# Patient Record
Sex: MALE | Race: WHITE
[De-identification: names, ages, dates, MRNs, and addresses within clinical notes are randomized per-mention and may not be internally consistent; named-entity substitution may affect disease eponyms.]

---

## 2021-05-28 ENCOUNTER — HOSPITAL ENCOUNTER (OUTPATIENT)
Dept: HOSPITAL 95 - ORSCMMR | Age: 51
Discharge: HOME | End: 2021-05-28
Attending: STUDENT IN AN ORGANIZED HEALTH CARE EDUCATION/TRAINING PROGRAM
Payer: COMMERCIAL

## 2021-05-28 VITALS — BODY MASS INDEX: 43.13 KG/M2 | HEIGHT: 71.65 IN | WEIGHT: 315 LBS

## 2021-05-28 DIAGNOSIS — Z79.899: ICD-10-CM

## 2021-05-28 DIAGNOSIS — G47.33: ICD-10-CM

## 2021-05-28 DIAGNOSIS — D12.0: ICD-10-CM

## 2021-05-28 DIAGNOSIS — D12.5: ICD-10-CM

## 2021-05-28 DIAGNOSIS — Z12.11: Primary | ICD-10-CM

## 2021-05-28 DIAGNOSIS — K62.1: ICD-10-CM

## 2021-05-28 DIAGNOSIS — E03.9: ICD-10-CM

## 2021-05-28 DIAGNOSIS — I10: ICD-10-CM

## 2021-05-28 DIAGNOSIS — K64.4: ICD-10-CM

## 2021-05-28 DIAGNOSIS — E11.9: ICD-10-CM

## 2021-05-28 DIAGNOSIS — K64.8: ICD-10-CM

## 2021-05-28 DIAGNOSIS — K57.30: ICD-10-CM

## 2021-05-28 NOTE — NUR
05/28/21 0808 Breanna Berumen
History, Chart, Medications and Allergies reviewed before start of
procedure.MONITOR INTACT WITH CONTINUOUS PULSE OXIMETRY AND
INTERMITTENT BP.3-LEAD EKG REVIEWED WITH PHYSICIAN PRIOR TO START OF
PROCEDURE.O2 VIA NRM. MAC CASE WITH DR. SPENCE

## 2021-05-28 NOTE — NUR
Patient up to Ambulate independently. Gait steady.
Discharge instructions reviewed with patient. Patient verbalizes understanding.
Copy given to patient to take home.
Discharged via wheelchair to private car for ride home WITH WIFE

## 2021-05-28 NOTE — NUR
Ambulatory in Day Surgery
History, Chart, Medications and Allergies reviewed before start of
procedure.Patient confirms NPO status and agrees with scheduled surgery.
Patient states colon prep results clear. PT REPORTS NOT DIABETIC, ON NO
DIABETES MEDS, NO BLOOD SUGAR CHECKED.

## 2021-10-19 ENCOUNTER — HOSPITAL ENCOUNTER (OUTPATIENT)
Dept: HOSPITAL 95 - ATC | Age: 51
Discharge: HOME | End: 2021-10-19
Attending: FAMILY MEDICINE
Payer: COMMERCIAL

## 2021-10-19 DIAGNOSIS — I10: ICD-10-CM

## 2021-10-19 DIAGNOSIS — Z79.890: ICD-10-CM

## 2021-10-19 DIAGNOSIS — U07.1: Primary | ICD-10-CM

## 2021-10-19 DIAGNOSIS — J30.9: ICD-10-CM

## 2021-10-19 DIAGNOSIS — E66.01: ICD-10-CM

## 2021-10-19 DIAGNOSIS — Z79.899: ICD-10-CM

## 2021-10-19 DIAGNOSIS — E78.5: ICD-10-CM

## 2022-08-24 ENCOUNTER — HOSPITAL ENCOUNTER (INPATIENT)
Dept: HOSPITAL 95 - ER | Age: 52
LOS: 7 days | Discharge: HOME | DRG: 872 | End: 2022-08-31
Attending: INTERNAL MEDICINE | Admitting: HOSPITALIST
Payer: COMMERCIAL

## 2022-08-24 VITALS — BODY MASS INDEX: 45.1 KG/M2 | WEIGHT: 315 LBS | HEIGHT: 70 IN

## 2022-08-24 DIAGNOSIS — Z99.81: ICD-10-CM

## 2022-08-24 DIAGNOSIS — A41.9: Primary | ICD-10-CM

## 2022-08-24 DIAGNOSIS — G47.33: ICD-10-CM

## 2022-08-24 DIAGNOSIS — E78.5: ICD-10-CM

## 2022-08-24 DIAGNOSIS — E66.9: ICD-10-CM

## 2022-08-24 DIAGNOSIS — R59.1: ICD-10-CM

## 2022-08-24 DIAGNOSIS — Z98.890: ICD-10-CM

## 2022-08-24 DIAGNOSIS — E03.9: ICD-10-CM

## 2022-08-24 DIAGNOSIS — Z79.899: ICD-10-CM

## 2022-08-24 DIAGNOSIS — I10: ICD-10-CM

## 2022-08-24 DIAGNOSIS — L03.115: ICD-10-CM

## 2022-08-24 DIAGNOSIS — D72.828: ICD-10-CM

## 2022-08-24 DIAGNOSIS — H10.31: ICD-10-CM

## 2022-08-24 LAB
ALBUMIN SERPL BCP-MCNC: 2.6 G/DL (ref 3.4–5)
ALBUMIN/GLOB SERPL: 0.5 {RATIO} (ref 0.8–1.8)
ALT SERPL W P-5'-P-CCNC: 32 U/L (ref 12–78)
ANION GAP SERPL CALCULATED.4IONS-SCNC: 8 MMOL/L (ref 6–16)
AST SERPL W P-5'-P-CCNC: 21 U/L (ref 12–37)
BASOPHILS # BLD: 0 K/MM3 (ref 0–0.23)
BASOPHILS NFR BLD: 0 % (ref 0–2)
BILIRUB SERPL-MCNC: 0.5 MG/DL (ref 0.1–1)
BUN SERPL-MCNC: 17 MG/DL (ref 8–24)
CALCIUM SERPL-MCNC: 9.6 MG/DL (ref 8.5–10.1)
CHLORIDE SERPL-SCNC: 102 MMOL/L (ref 98–108)
CO2 SERPL-SCNC: 27 MMOL/L (ref 21–32)
CREAT SERPL-MCNC: 0.97 MG/DL (ref 0.6–1.2)
DEPRECATED RDW RBC AUTO: 42.6 FL (ref 35.1–46.3)
EOSINOPHIL # BLD: 0 K/MM3 (ref 0–0.68)
EOSINOPHIL NFR BLD: 0 % (ref 0–6)
ERYTHROCYTE [DISTWIDTH] IN BLOOD BY AUTOMATED COUNT: 13.2 % (ref 11.7–14.2)
GLOBULIN SER CALC-MCNC: 5.6 G/DL (ref 2.2–4)
GLUCOSE SERPL-MCNC: 121 MG/DL (ref 70–99)
HCT VFR BLD AUTO: 40.9 % (ref 37–53)
HGB BLD-MCNC: 13.9 G/DL (ref 13.5–17.5)
LYMPHOCYTES # BLD: 0.76 K/MM3 (ref 0.84–5.2)
LYMPHOCYTES NFR BLD: 5 % (ref 21–46)
MCHC RBC AUTO-ENTMCNC: 34 G/DL (ref 31.5–36.5)
MCV RBC AUTO: 88 FL (ref 80–100)
MONOCYTES # BLD: 1.21 K/MM3 (ref 0.16–1.47)
MONOCYTES NFR BLD: 8 % (ref 4–13)
MYELOCYTES # BLD MANUAL: 0.3 K/MM3 (ref 0–0)
MYELOCYTES NFR BLD MANUAL: 2 % (ref 0–0)
NEUTS BAND NFR BLD MANUAL: 2 % (ref 0–8)
NEUTS SEG # BLD MANUAL: 12.95 K/MM3 (ref 1.96–9.15)
NEUTS SEG NFR BLD MANUAL: 83 % (ref 41–73)
NRBC # BLD AUTO: 0 K/MM3 (ref 0–0.02)
NRBC BLD AUTO-RTO: 0 /100 WBC (ref 0–0.2)
PLATELET # BLD AUTO: 196 K/MM3 (ref 150–400)
POTASSIUM SERPL-SCNC: 3.7 MMOL/L (ref 3.5–5.5)
PROT SERPL-MCNC: 8.2 G/DL (ref 6.4–8.2)
SODIUM SERPL-SCNC: 137 MMOL/L (ref 136–145)
TOTAL CELLS COUNTED BLD: 100

## 2022-08-24 PROCEDURE — A9270 NON-COVERED ITEM OR SERVICE: HCPCS

## 2022-08-25 LAB
ALBUMIN SERPL BCP-MCNC: 2.2 G/DL (ref 3.4–5)
ANION GAP SERPL CALCULATED.4IONS-SCNC: 7 MMOL/L (ref 6–16)
BASOPHILS # BLD AUTO: 0.09 K/MM3 (ref 0–0.23)
BASOPHILS NFR BLD AUTO: 1 % (ref 0–2)
BUN SERPL-MCNC: 18 MG/DL (ref 8–24)
CALCIUM SERPL-MCNC: 8.5 MG/DL (ref 8.5–10.1)
CHLORIDE SERPL-SCNC: 101 MMOL/L (ref 98–108)
CO2 SERPL-SCNC: 29 MMOL/L (ref 21–32)
CREAT SERPL-MCNC: 1.03 MG/DL (ref 0.6–1.2)
DEPRECATED RDW RBC AUTO: 44 FL (ref 35.1–46.3)
EOSINOPHIL # BLD AUTO: 0.05 K/MM3 (ref 0–0.68)
EOSINOPHIL NFR BLD AUTO: 0 % (ref 0–6)
ERYTHROCYTE [DISTWIDTH] IN BLOOD BY AUTOMATED COUNT: 13.4 % (ref 11.7–14.2)
GLUCOSE SERPL-MCNC: 114 MG/DL (ref 70–99)
HCT VFR BLD AUTO: 36.7 % (ref 37–53)
HGB BLD-MCNC: 12.2 G/DL (ref 13.5–17.5)
IMM GRANULOCYTES # BLD AUTO: 0.36 K/MM3 (ref 0–0.1)
IMM GRANULOCYTES NFR BLD AUTO: 3 % (ref 0–1)
LYMPHOCYTES # BLD AUTO: 1.48 K/MM3 (ref 0.84–5.2)
LYMPHOCYTES NFR BLD AUTO: 10 % (ref 21–46)
MCHC RBC AUTO-ENTMCNC: 33.2 G/DL (ref 31.5–36.5)
MCV RBC AUTO: 90 FL (ref 80–100)
MONOCYTES # BLD AUTO: 1.66 K/MM3 (ref 0.16–1.47)
MONOCYTES NFR BLD AUTO: 12 % (ref 4–13)
NEUTROPHILS # BLD AUTO: 10.75 K/MM3 (ref 1.96–9.15)
NEUTROPHILS NFR BLD AUTO: 75 % (ref 41–73)
NRBC # BLD AUTO: 0 K/MM3 (ref 0–0.02)
NRBC BLD AUTO-RTO: 0 /100 WBC (ref 0–0.2)
PHOSPHATE SERPL-MCNC: 3 MG/DL (ref 2.5–4.9)
PLATELET # BLD AUTO: 183 K/MM3 (ref 150–400)
POTASSIUM SERPL-SCNC: 3.6 MMOL/L (ref 3.5–5.5)
SODIUM SERPL-SCNC: 137 MMOL/L (ref 136–145)

## 2022-08-25 NOTE — NUR
SHIFT SUMMARY
 
PATIENT MEDICATED X1 FOR PAIN IN RLE WITH IV TORADOL. DENIES NAUSEA AND
SHORTNESS OF BREATH. UP SBA W/FWW TO BATHROOM. GOOD APPETITE. POWERGLIDE
PLACED TODAY. RLE ELEVATED ON PILLOW. PATIENT PLEASANT AND COOPERATE WITH
CARE.

## 2022-08-25 NOTE — NUR
SHIFT SUMMARY;  THIS MELISSA IS A LATE ADMIT FROM ER DURING NOC SHIFT TONIGHT.
ADMITTING DIAGNOSES IS CELLULITIS RIGHT LOWER LEG.  HE IS ABLE TO AMBULATE
WITH SBA TO AND FROM BATHROOM. HE IS INDEPENDANT OF ALL OTHER ADL'S.  HE IS AO
X 4 ON ARRIVAL.
 
PATIENT WEARS A CPAP AT NIGHT AND BROUGHT HIS HOME MACHINE IN TO HOSPITAL, RT
NOTIFIED AND THEY CAME AND SET UP MACHINE WITH CONTINUOUS SAT MONITOR.
 
MELISSA IS ON ROOM AIR AT HOME AND CURRENTLY HERE ON THE MED FLOOR.  HIS VITAL
SIGNS ARE STABLE AND MELISSA IS NOT FEBRILE.  HE EXPRESSES HIS LEG HAS A
BURNING SENSATION AND ONLY HAS MUCH PAIN WHEN HE AMBULATES.  HIS LUNGS ARE
CLEAR TO AUSCULTATION AND PATIENT DENIES COUGH.  HIS BOWEL TONES ARE CURRENTLY
HYPERACTIVE. PATIENT SAYS HE HAS NOT BEEN EATING MUCH SINCE HIS LEG STARTED
FEELING INFECTED.  HIS ABDOMEN IS DISTENDED HOWEVER PATIENT SAYS THIS IS
NORMAL FOR HIM.
 
HE HAS PURPLE COLORED DIME SIZE SCARS TO BILATERAL LOWER EXTREMITIES WHICH
PATIENT SAYS ARE FROM WELDING OVER THE PAST FEW YEARS.
 
HE IS ON A CARDIAC DIET.  PATIENT PROVIDED WITH WATER AT BEDSIDE. HE REFUSES
ANY SNACKS TONIGHT.
 
PATIENT REFUSES ANY PAIN MEDICATION AT THIS TIME. SAYING THAT AS LONG AS HE IS
NOT WALKING, HIS PAIN IS BEARABLE.
 
WILL CONTINUE TO MOONITOR THIS PATIENT FOR ANY WANTS OR NEEDS UNTIL HAND OFF
AND REPORT TO DAY SHIFT RN.

## 2022-08-26 LAB
ALBUMIN SERPL BCP-MCNC: 2.2 G/DL (ref 3.4–5)
ANION GAP SERPL CALCULATED.4IONS-SCNC: 6 MMOL/L (ref 6–16)
BUN SERPL-MCNC: 23 MG/DL (ref 8–24)
CALCIUM SERPL-MCNC: 8.5 MG/DL (ref 8.5–10.1)
CHLORIDE SERPL-SCNC: 101 MMOL/L (ref 98–108)
CO2 SERPL-SCNC: 29 MMOL/L (ref 21–32)
CREAT SERPL-MCNC: 1.04 MG/DL (ref 0.6–1.2)
DEPRECATED RDW RBC AUTO: 43.6 FL (ref 35.1–46.3)
ERYTHROCYTE [DISTWIDTH] IN BLOOD BY AUTOMATED COUNT: 13.3 % (ref 11.7–14.2)
GLUCOSE SERPL-MCNC: 119 MG/DL (ref 70–99)
HCT VFR BLD AUTO: 34.7 % (ref 37–53)
HGB BLD-MCNC: 11.5 G/DL (ref 13.5–17.5)
MCHC RBC AUTO-ENTMCNC: 33.1 G/DL (ref 31.5–36.5)
MCV RBC AUTO: 90 FL (ref 80–100)
NRBC # BLD AUTO: 0 K/MM3 (ref 0–0.02)
NRBC BLD AUTO-RTO: 0 /100 WBC (ref 0–0.2)
PHOSPHATE SERPL-MCNC: 3.7 MG/DL (ref 2.5–4.9)
PLATELET # BLD AUTO: 181 K/MM3 (ref 150–400)
POTASSIUM SERPL-SCNC: 3.4 MMOL/L (ref 3.5–5.5)
SODIUM SERPL-SCNC: 136 MMOL/L (ref 136–145)

## 2022-08-26 NOTE — NUR
2018 PT LYING IN BED, REPORTS PAIN IN NECK/HEAD/RLE, GAVE TYLENOL, WILL EVAL
FOR EFFECT. CELLULITIS/REDNESS/EDEMA 2-3+ IIN RLE, EDEMA 1--2+ LLE. NO OTHER
APPARENT SIGNS OF DISTRESS. CALL LIGHT IS IN REACH.

## 2022-08-26 NOTE — NUR
SHIFT SUMMARY:
 
NO ACUTE EVENTS. PT'S WIFE STATED THIS EVENING THAT RLE ERYTHEMA HAS NOT
CHANGED SINCE ADMISSION, IS WONDERING IF ABX SHOULD BE CHANGED. WHEN PT HAD
THIS BEFORE SEVERAL YEARS AGO, HE WAS GIVEN LEVOFLOXACIN WHICH RESOLVED
INFECTION. GETTING UP TO BR WITH FWW. MEDICATED FOR PAIN X 1 WITH RELIEF.
POTASSIUM REPLACED. CPAP AT HS.

## 2022-08-26 NOTE — NUR
PT LYING IN BED, EYES CLOSD, APPEARS TO BE RESTING. WAKES EASILY TO VERBAL
STIMULI. NO APPARENT SIGNS OF DISTRESS. DENIES NEED FOR ANYTHIG AT THIS TIME.
CALL LIGHT IS IN REACH.

## 2022-08-26 NOTE — NUR
SHIFT SUMMARY
 
PT MEDICATED X1 FOR PAIN IN RLE WITH IV TORADOL. DENIES NAUSEA AND SOB. PT HAS
RLE CELLULITIS AND STATED HIS RIGHT INNER THIGH WAS BOTHERING HIM MORE TODAY,
THE SKIN IN THAT AREA HAS SOME REDNESS. PT SLEPT WELL T/O THE NIGHT WITH
BIPAP. PT STATES HE HAS NOT HAD A BM FOR A FEW DAYS, MIRALAX IS SCHEDULED FOR
0900. PT IS AOX4, INDEPENDANT IN ROOM WITH SBA, PLEASANT AND COOPERATIVE WITH
CARE.

## 2022-08-27 NOTE — NUR
PT LYING IN BED, EYES CLOSED, APPEARS TO BE RESTING. BREATHING IS EVEN,
UNLABORED. CPAP AND CONTINUOUS BIOX ARE ON. NO APPARENT SIGNS OF DISTRESS.
CALL LIGHT IS IN REACH.

## 2022-08-27 NOTE — NUR
SHIFT SUMMARY:
 
NO ACUTE EVENTS. ERYTHEMA ON RLE APPEARS SLIGHTLY DECREASED. PT IS ABLE TO
AMBULATE WITH MINIMAL DISCOMFORT, USES FWW FOR STABILITY. APPETITE IS GOOD.
MEDICATED FOR PAIN X 1 WITH GOOD RELIEF.

## 2022-08-27 NOTE — NUR
PT LYING IN BED, EYES CLOSED, APPEARS TO BE RESTING. WAKES EASILY TO VERBAL
STIMULI. NO APPARENT SIGNS OF DISTRESS. CALL LIGHT IS IN REACH. DENIES NEED
FOR ANYTHING AT THIS TIME.

## 2022-08-27 NOTE — NUR
2014 PT LYING IN BED, DENIES PAIN AT THIS TIME. REDNESS AND EDEMA 2-3+ R LEG,
EDEMA 1-2+ L LEG. NO OTHER APPARENT SIGNS OF DISTRESS. CALL LIGHT IS IN REACH.

## 2022-08-27 NOTE — NUR
PT LYING IN BED, EYES CLOSED, APPEARS TO BE RESTING. BREATHING IS EVEN,
UNLABORED. NO APPARENT SIGNS OF DISTRESSL CALL LIGHT IS IN REACH.

## 2022-08-28 LAB
ALBUMIN SERPL BCP-MCNC: 2.2 G/DL (ref 3.4–5)
ANION GAP SERPL CALCULATED.4IONS-SCNC: 6 MMOL/L (ref 6–16)
BASOPHILS # BLD: 0 K/MM3 (ref 0–0.23)
BASOPHILS NFR BLD: 0 % (ref 0–2)
BUN SERPL-MCNC: 17 MG/DL (ref 8–24)
CALCIUM SERPL-MCNC: 8.4 MG/DL (ref 8.5–10.1)
CHLORIDE SERPL-SCNC: 100 MMOL/L (ref 98–108)
CO2 SERPL-SCNC: 31 MMOL/L (ref 21–32)
CREAT SERPL-MCNC: 0.99 MG/DL (ref 0.6–1.2)
DEPRECATED RDW RBC AUTO: 44 FL (ref 35.1–46.3)
EOSINOPHIL # BLD: 0.3 K/MM3 (ref 0–0.68)
EOSINOPHIL NFR BLD: 2 % (ref 0–6)
ERYTHROCYTE [DISTWIDTH] IN BLOOD BY AUTOMATED COUNT: 13.2 % (ref 11.7–14.2)
GLUCOSE SERPL-MCNC: 129 MG/DL (ref 70–99)
HCT VFR BLD AUTO: 34.4 % (ref 37–53)
HGB BLD-MCNC: 11.4 G/DL (ref 13.5–17.5)
LYMPHOCYTES # BLD: 1.35 K/MM3 (ref 0.84–5.2)
LYMPHOCYTES NFR BLD: 9 % (ref 21–46)
MCHC RBC AUTO-ENTMCNC: 33.1 G/DL (ref 31.5–36.5)
MCV RBC AUTO: 91 FL (ref 80–100)
METAMYELOCYTES # BLD MANUAL: 0.3 K/MM3 (ref 0–0)
METAMYELOCYTES NFR BLD MANUAL: 2 % (ref 0–0)
MONOCYTES # BLD: 1.05 K/MM3 (ref 0.16–1.47)
MONOCYTES NFR BLD: 7 % (ref 4–13)
MYELOCYTES # BLD MANUAL: 0.3 K/MM3 (ref 0–0)
MYELOCYTES NFR BLD MANUAL: 2 % (ref 0–0)
NEUTS BAND NFR BLD MANUAL: 10 % (ref 0–8)
NEUTS SEG # BLD MANUAL: 11.76 K/MM3 (ref 1.96–9.15)
NEUTS SEG NFR BLD MANUAL: 68 % (ref 41–73)
NRBC # BLD AUTO: 0 K/MM3 (ref 0–0.02)
NRBC BLD AUTO-RTO: 0 /100 WBC (ref 0–0.2)
PHOSPHATE SERPL-MCNC: 3.2 MG/DL (ref 2.5–4.9)
PLATELET # BLD AUTO: 223 K/MM3 (ref 150–400)
POTASSIUM SERPL-SCNC: 3.5 MMOL/L (ref 3.5–5.5)
SODIUM SERPL-SCNC: 137 MMOL/L (ref 136–145)
TOTAL CELLS COUNTED BLD: 100

## 2022-08-28 NOTE — NUR
PT LYING IN BED, EYES CLOSED, APPEARS TO BE RESTING. BREATHING IS EVEN,
UNLABORED. NO APPARENT SIGNS OF DISTRESS. CPAP AND CONT BIOC ARE ON. CALL
LIGHT IS IN REACH.

## 2022-08-28 NOTE — NUR
PT LYING IN BED, EYES CLOSED, APPEARS TO BE RESTING. BREATHING IS EVEN,
UNLABORED. NO APPARENT SIGNS OF DISTRESS. CALL LIGHT IS IN REACH. CPAP AND
CONTINUOUS BIOX ON.

## 2022-08-28 NOTE — NUR
SHIFT SUMMARY:
 
NO ACUTE EVENTS. RLE CELLULITIS SHOWS NO IMPROVEMENT AND HE STATES PAIN HAS
INCREASED; ABX INTERVAL CHANGED TO Q6 HOURS. MEDICATED FOR PAIN PER EMAR WITH
ADEQUATE RELIEF. R EYE CONJUNCTIVITIS IMPROVED, SWELLING DOWN. WEARING CPAP
WHILE ASLEEP. GOOD APPETITE AND PO FLUID INTAKE. IS WALKING IN HALLWAY TO HELP
EDEMA, ELEVATED ON 2 PILLOWS WHEN IN BED.

## 2022-08-28 NOTE — NUR
2014 PT LYING IN BED, REPORTS PAIN IN R HIP AND R LEG. RECENTLY GOT TYLENOL.
GAVE TORADOL. WILL EVAL FOR EFFECT. REDNESS AND EDEMA 2-3+ IN RLE. EDEMA 1-2+
LLE. TEMP .5, PT RECENTLY RECIEVED TYLENOL. TURNED TEMP IN ROOM DOWN,
MADE SURE THERE WAS ONLY 1 BLANKET. PT DECLINED TO USE ICE PACK OR COOL
WASHCLOTH. NO OTHER APPARENT SIGNS OF DISTRESS. CALL LIGHT IS IN REACH.

## 2022-08-29 LAB
BASOPHILS # BLD: 0 K/MM3 (ref 0–0.23)
BASOPHILS # BLD: 0 K/MM3 (ref 0–0.23)
BASOPHILS NFR BLD: 0 % (ref 0–2)
BASOPHILS NFR BLD: 0 % (ref 0–2)
DEPRECATED RDW RBC AUTO: 43 FL (ref 35.1–46.3)
DEPRECATED RDW RBC AUTO: 43.5 FL (ref 35.1–46.3)
EOSINOPHIL # BLD: 0 K/MM3 (ref 0–0.68)
EOSINOPHIL # BLD: 0 K/MM3 (ref 0–0.68)
EOSINOPHIL NFR BLD: 0 % (ref 0–6)
EOSINOPHIL NFR BLD: 0 % (ref 0–6)
ERYTHROCYTE [DISTWIDTH] IN BLOOD BY AUTOMATED COUNT: 12.8 % (ref 11.7–14.2)
ERYTHROCYTE [DISTWIDTH] IN BLOOD BY AUTOMATED COUNT: 13 % (ref 11.7–14.2)
HCT VFR BLD AUTO: 34.1 % (ref 37–53)
HCT VFR BLD AUTO: 35.7 % (ref 37–53)
HGB BLD-MCNC: 11.2 G/DL (ref 13.5–17.5)
HGB BLD-MCNC: 11.6 G/DL (ref 13.5–17.5)
LYMPHOCYTES # BLD: 0.64 K/MM3 (ref 0.84–5.2)
LYMPHOCYTES # BLD: 1.03 K/MM3 (ref 0.84–5.2)
LYMPHOCYTES NFR BLD: 5 % (ref 21–46)
LYMPHOCYTES NFR BLD: 7 % (ref 21–46)
MCHC RBC AUTO-ENTMCNC: 32.5 G/DL (ref 31.5–36.5)
MCHC RBC AUTO-ENTMCNC: 32.8 G/DL (ref 31.5–36.5)
MCV RBC AUTO: 91 FL (ref 80–100)
MCV RBC AUTO: 91 FL (ref 80–100)
METAMYELOCYTES # BLD MANUAL: 0.14 K/MM3 (ref 0–0)
METAMYELOCYTES # BLD MANUAL: 0.38 K/MM3 (ref 0–0)
METAMYELOCYTES NFR BLD MANUAL: 1 % (ref 0–0)
METAMYELOCYTES NFR BLD MANUAL: 3 % (ref 0–0)
MONOCYTES # BLD: 0.59 K/MM3 (ref 0.16–1.47)
MONOCYTES # BLD: 0.89 K/MM3 (ref 0.16–1.47)
MONOCYTES NFR BLD: 4 % (ref 4–13)
MONOCYTES NFR BLD: 7 % (ref 4–13)
MYELOCYTES # BLD MANUAL: 0.25 K/MM3 (ref 0–0)
MYELOCYTES # BLD MANUAL: 0.29 K/MM3 (ref 0–0)
MYELOCYTES NFR BLD MANUAL: 2 % (ref 0–0)
MYELOCYTES NFR BLD MANUAL: 2 % (ref 0–0)
NEUTS BAND NFR BLD MANUAL: 13 % (ref 0–8)
NEUTS BAND NFR BLD MANUAL: 3 % (ref 0–8)
NEUTS SEG # BLD MANUAL: 10.64 K/MM3 (ref 1.96–9.15)
NEUTS SEG # BLD MANUAL: 12.76 K/MM3 (ref 1.96–9.15)
NEUTS SEG NFR BLD MANUAL: 70 % (ref 41–73)
NEUTS SEG NFR BLD MANUAL: 83 % (ref 41–73)
NRBC # BLD AUTO: 0 K/MM3 (ref 0–0.02)
NRBC # BLD AUTO: 0 K/MM3 (ref 0–0.02)
NRBC BLD AUTO-RTO: 0 /100 WBC (ref 0–0.2)
NRBC BLD AUTO-RTO: 0 /100 WBC (ref 0–0.2)
PLATELET # BLD AUTO: 241 K/MM3 (ref 150–400)
PLATELET # BLD AUTO: 257 K/MM3 (ref 150–400)
TOTAL CELLS COUNTED BLD: 100
TOTAL CELLS COUNTED BLD: 100

## 2022-08-29 NOTE — NUR
PT AWAKE, JUST USED BATHROOM. REQUESTED AND RECIEVED TORADOL, WILL EVAL FOR
EFFECT. NO OTHER APPARENT SIGNS OF DISTRESS. CALL LIGHT IS IN REACH.

## 2022-08-29 NOTE — NUR
PT REQUESTED AND RECIEVED SNACK, DENIES NEED FOR PAIN MEDS AT THIS TIME. NO
OTHER APPARENT SIGNS OF DISTRESS. CALL LIGHT IS IN REACH. NO OTHER CHANGES
THIS SHIFT.

## 2022-08-29 NOTE — NUR
PT LYING IN BED, EYES CLOSED, APPEARS TO BE RESTING. BREATHING IS EVEN,
UNLABORED. NO APPARENT SIGNS OF DISTRESS. CALL LIGHT IS IN REACH. CPAP IS ON.

## 2022-08-29 NOTE — NUR
RECEIVED BEDSIDE REPORT. FAMILY AT BEDSIDE. PT IN BED. NO NEEDS AT THIS TIME.
CALL LT IN REACH. WILL CONTINUE TO PROVIDE CARE T/O SHIFT.

## 2022-08-29 NOTE — NUR
SHIFT SUMMARY-
BEDSIDE REPORT COMPLETED WITH NIGHT RN. PT IN BED SURROUNDED BY FAMILY NO S&S
OF DISTRESS NOTED AT THE TIME OF REPORT. PT DID REPORT A SMALL HEADACHE
STARTING. TYLENOL WAS CHANGED TO Q8 SCHEDULED EARLIER IN THE SHIFT. NIGHT RN
AWARE. PT RLE ELEVATED ON PILLOWS PER MD ORDERS. PT STATES PAIN IS DOWN TO
1/10 IN HIS RLE AND THE SWELLING APPEARS TO HAVE IMPROVED. REDNESS THIS
EVENING DOES NOT SEEM TO BE AS DARK IN COLOR. NIGHT RN AWARE.

## 2022-08-29 NOTE — NUR
PT WEARING CPAP. TOOK SANCHEZ MEDS AND WENT BACK ON CPAP. CONT BIOX 97%. NO OTHER
NEEDS. CALL LT IN REACH.

## 2022-08-30 LAB
ALBUMIN SERPL BCP-MCNC: 2.1 G/DL (ref 3.4–5)
ANION GAP SERPL CALCULATED.4IONS-SCNC: 7 MMOL/L (ref 6–16)
BASOPHILS # BLD AUTO: 0.1 K/MM3 (ref 0–0.23)
BASOPHILS NFR BLD AUTO: 1 % (ref 0–2)
BUN SERPL-MCNC: 15 MG/DL (ref 8–24)
CALCIUM SERPL-MCNC: 8.3 MG/DL (ref 8.5–10.1)
CHLORIDE SERPL-SCNC: 106 MMOL/L (ref 98–108)
CO2 SERPL-SCNC: 25 MMOL/L (ref 21–32)
CREAT SERPL-MCNC: 0.81 MG/DL (ref 0.6–1.2)
DEPRECATED RDW RBC AUTO: 41.5 FL (ref 35.1–46.3)
EOSINOPHIL # BLD AUTO: 0.13 K/MM3 (ref 0–0.68)
EOSINOPHIL NFR BLD AUTO: 1 % (ref 0–6)
ERYTHROCYTE [DISTWIDTH] IN BLOOD BY AUTOMATED COUNT: 12.7 % (ref 11.7–14.2)
GLUCOSE SERPL-MCNC: 115 MG/DL (ref 70–99)
HCT VFR BLD AUTO: 38.6 % (ref 37–53)
HGB BLD-MCNC: 12.8 G/DL (ref 13.5–17.5)
IMM GRANULOCYTES # BLD AUTO: 0.52 K/MM3 (ref 0–0.1)
IMM GRANULOCYTES NFR BLD AUTO: 4 % (ref 0–1)
LYMPHOCYTES # BLD AUTO: 1.38 K/MM3 (ref 0.84–5.2)
LYMPHOCYTES NFR BLD AUTO: 11 % (ref 21–46)
MCHC RBC AUTO-ENTMCNC: 33.2 G/DL (ref 31.5–36.5)
MCV RBC AUTO: 89 FL (ref 80–100)
MONOCYTES # BLD AUTO: 0.92 K/MM3 (ref 0.16–1.47)
MONOCYTES NFR BLD AUTO: 8 % (ref 4–13)
NEUTROPHILS # BLD AUTO: 9.06 K/MM3 (ref 1.96–9.15)
NEUTROPHILS NFR BLD AUTO: 75 % (ref 41–73)
NRBC # BLD AUTO: 0 K/MM3 (ref 0–0.02)
NRBC BLD AUTO-RTO: 0 /100 WBC (ref 0–0.2)
PHOSPHATE SERPL-MCNC: 3.4 MG/DL (ref 2.5–4.9)
PLATELET # BLD AUTO: 234 K/MM3 (ref 150–400)
POTASSIUM SERPL-SCNC: 4.3 MMOL/L (ref 3.5–5.5)
SODIUM SERPL-SCNC: 138 MMOL/L (ref 136–145)

## 2022-08-30 NOTE — NUR
SHIFT SUMMARY-
PT ALERT, ORIENTED AND INDEPENDENT IN THE ROOM. PT CALLS APPROPRIATELY. SPOKE
TO MD EARLIER IN THE SHIFT AND THE PT NOW HAS IBUPROFEN AVAILABLE PRN AS WELL
AS SCHEDULED TYLENOL. PT IS CURRENTLY IN BED WITH THE LEG ELEVATED ON 5
PILLOWS, PER MD ORDER ANKLE ABOVE THE KNEE, KNEE ABOVE THE HIP. PT IS AWARE HE
NEEDS TO KEEP IT ELEVATED. EYE DROPS Q3 HOURS FOR OCCULAR INFECTION TO THE
RIGHT EYE. CALL LIGHT IN REACH NO S&S OF DISTRESS AT THIS TIME WILL CTM AND
PASS ON TO NIGHT RN IN BEDSIDE REPORT.

## 2022-08-30 NOTE — NUR
SHIFT SUMMARY:
A/O. INDEP IN RM. ON RA. PG TO KENZIE IS SL. RECEIVED SANCHEZ TORADOL 30MG IV FOR
PAIN AND TYLENOL. EYES DROPS TO R EYE EVERY 3HRS WHILE AWAKE. WORE CPAP DURING
SHIFT, CONT BIOX AT 97%. R LEG CELLULITIS APPEARS TO BE IMPROVING, REDNESS
RECEDING AND SWELLING DECREASING. NO ACUTE CHANGES. WILL CONTINUE TO PROVIDE
CARE UNTIL SHIFT REPORT.

## 2022-08-31 LAB
ALBUMIN SERPL BCP-MCNC: 2 G/DL (ref 3.4–5)
ANION GAP SERPL CALCULATED.4IONS-SCNC: 4 MMOL/L (ref 6–16)
BASOPHILS # BLD AUTO: 0.06 K/MM3 (ref 0–0.23)
BASOPHILS NFR BLD AUTO: 1 % (ref 0–2)
BUN SERPL-MCNC: 12 MG/DL (ref 8–24)
CALCIUM SERPL-MCNC: 8.4 MG/DL (ref 8.5–10.1)
CHLORIDE SERPL-SCNC: 105 MMOL/L (ref 98–108)
CO2 SERPL-SCNC: 30 MMOL/L (ref 21–32)
CREAT SERPL-MCNC: 0.8 MG/DL (ref 0.6–1.2)
DEPRECATED RDW RBC AUTO: 43.6 FL (ref 35.1–46.3)
EOSINOPHIL # BLD AUTO: 0.14 K/MM3 (ref 0–0.68)
EOSINOPHIL NFR BLD AUTO: 1 % (ref 0–6)
ERYTHROCYTE [DISTWIDTH] IN BLOOD BY AUTOMATED COUNT: 12.7 % (ref 11.7–14.2)
GLUCOSE SERPL-MCNC: 106 MG/DL (ref 70–99)
HCT VFR BLD AUTO: 36.4 % (ref 37–53)
HGB BLD-MCNC: 11.6 G/DL (ref 13.5–17.5)
IMM GRANULOCYTES # BLD AUTO: 0.22 K/MM3 (ref 0–0.1)
IMM GRANULOCYTES NFR BLD AUTO: 2 % (ref 0–1)
LYMPHOCYTES # BLD AUTO: 1.47 K/MM3 (ref 0.84–5.2)
LYMPHOCYTES NFR BLD AUTO: 14 % (ref 21–46)
MCHC RBC AUTO-ENTMCNC: 31.9 G/DL (ref 31.5–36.5)
MCV RBC AUTO: 92 FL (ref 80–100)
MONOCYTES # BLD AUTO: 0.83 K/MM3 (ref 0.16–1.47)
MONOCYTES NFR BLD AUTO: 8 % (ref 4–13)
NEUTROPHILS # BLD AUTO: 8.09 K/MM3 (ref 1.96–9.15)
NEUTROPHILS NFR BLD AUTO: 75 % (ref 41–73)
NRBC # BLD AUTO: 0 K/MM3 (ref 0–0.02)
NRBC BLD AUTO-RTO: 0 /100 WBC (ref 0–0.2)
PHOSPHATE SERPL-MCNC: 3.6 MG/DL (ref 2.5–4.9)
PLATELET # BLD AUTO: 265 K/MM3 (ref 150–400)
POTASSIUM SERPL-SCNC: 4.7 MMOL/L (ref 3.5–5.5)
SODIUM SERPL-SCNC: 139 MMOL/L (ref 136–145)

## 2022-08-31 NOTE — NUR
NIGHT SHIFT SUMMARY
 
ADMITTED WITH CELLULITIS OF THE RIGHT LEG. THE PATIENT IS A FULL CODE. HE IS
RECEIVING IV ABX THERAPY AND REPORTS IMPROVEMENT OF THE REDNESS AND SWELLING
TO THE RIGHT LEG. PAIN HAS BEEN MANAGED WITH SCHEDULED TYLENOL AND PRN
IBUPROFEN. HE IS RECEIVING EYE DROPS IN THE RIGHT EYE FOR INFECTION. ALERT AND
ORIENTED X4, COOPERATIVE WITH CARE. AMBULATING TO THE RESTROOM INDEPENDENTLY.

## 2022-08-31 NOTE — NUR
DISCHARGE NOTE-
PT AND SPOUSE WERE GIVEN VERBAL AND WRITTEN DISCHARGE INSTRUCTIONS AND
ACKNOWLEDGED UNDERSTANDING OF THEM. PG DC'D AFTER 1200 ABX WERE GIVEN. PT WAS
ABLE TO TRANSFER TO  AT THE TIME OF DISCHARGE INDEPENDENTLY, NO S&S OF
DISTRESS NOTED AT THE TIME OF DISCHARGE. PT ESCORTED OUT TO THE [facility]
VIA  BY THE CNA.